# Patient Record
Sex: MALE | Race: AMERICAN INDIAN OR ALASKA NATIVE | HISPANIC OR LATINO | URBAN - METROPOLITAN AREA
[De-identification: names, ages, dates, MRNs, and addresses within clinical notes are randomized per-mention and may not be internally consistent; named-entity substitution may affect disease eponyms.]

---

## 2024-01-19 ENCOUNTER — CLAIMS CREATED FROM THE CLAIM WINDOW (OUTPATIENT)
Dept: URBAN - METROPOLITAN AREA MEDICAL CENTER 25 | Facility: MEDICAL CENTER | Age: 34
End: 2024-01-19
Payer: COMMERCIAL

## 2024-01-19 DIAGNOSIS — R13.12 DYSPHAGIA: ICD-10-CM

## 2024-01-19 DIAGNOSIS — R93.3 ABN FINDINGS-GI TRACT: ICD-10-CM

## 2024-01-19 PROCEDURE — 99253 IP/OBS CNSLTJ NEW/EST LOW 45: CPT | Performed by: STUDENT IN AN ORGANIZED HEALTH CARE EDUCATION/TRAINING PROGRAM

## 2024-01-22 ENCOUNTER — CLAIMS CREATED FROM THE CLAIM WINDOW (OUTPATIENT)
Dept: URBAN - METROPOLITAN AREA MEDICAL CENTER 25 | Facility: MEDICAL CENTER | Age: 34
End: 2024-01-22
Payer: COMMERCIAL

## 2024-01-22 DIAGNOSIS — R13.12 DYSPHAGIA: ICD-10-CM

## 2024-01-22 PROCEDURE — 43246 EGD PLACE GASTROSTOMY TUBE: CPT | Performed by: INTERNAL MEDICINE

## 2024-01-23 ENCOUNTER — CLAIMS CREATED FROM THE CLAIM WINDOW (OUTPATIENT)
Dept: URBAN - METROPOLITAN AREA MEDICAL CENTER 25 | Facility: MEDICAL CENTER | Age: 34
End: 2024-01-23
Payer: COMMERCIAL

## 2024-01-23 DIAGNOSIS — R13.12 DYSPHAGIA: ICD-10-CM

## 2024-01-23 PROCEDURE — 99232 SBSQ HOSP IP/OBS MODERATE 35: CPT | Performed by: INTERNAL MEDICINE

## 2024-01-24 ENCOUNTER — TELEPHONE ENCOUNTER (OUTPATIENT)
Dept: URBAN - METROPOLITAN AREA CLINIC 19 | Facility: CLINIC | Age: 34
End: 2024-01-24

## 2024-03-19 ENCOUNTER — LAB (OUTPATIENT)
Dept: URBAN - METROPOLITAN AREA CLINIC 128 | Facility: CLINIC | Age: 34
End: 2024-03-19

## 2024-03-19 ENCOUNTER — OV NP (OUTPATIENT)
Dept: URBAN - METROPOLITAN AREA CLINIC 128 | Facility: CLINIC | Age: 34
End: 2024-03-19
Payer: COMMERCIAL

## 2024-03-19 VITALS
BODY MASS INDEX: 20.45 KG/M2 | SYSTOLIC BLOOD PRESSURE: 126 MMHG | WEIGHT: 151 LBS | HEIGHT: 72 IN | TEMPERATURE: 97.7 F | DIASTOLIC BLOOD PRESSURE: 80 MMHG

## 2024-03-19 DIAGNOSIS — R63.4 WEIGHT LOSS, UNINTENTIONAL: ICD-10-CM

## 2024-03-19 DIAGNOSIS — R13.12 TRANSFER DYSPHAGIA: ICD-10-CM

## 2024-03-19 DIAGNOSIS — Z93.1 PRESENCE OF EXTERNALLY REMOVABLE PERCUTANEOUS ENDOSCOPIC GASTROSTOMY (PEG) TUBE: ICD-10-CM

## 2024-03-19 PROBLEM — 71457002: Status: ACTIVE | Noted: 2024-03-19

## 2024-03-19 PROBLEM — 413235002: Status: ACTIVE | Noted: 2024-03-19

## 2024-03-19 PROCEDURE — 99214 OFFICE O/P EST MOD 30 MIN: CPT | Performed by: INTERNAL MEDICINE

## 2024-03-19 NOTE — PHYSICAL EXAM CHEST:
chest wall non-tender anteriorly, breathing is unlabored without accessory muscle use, normal breath sounds anteriorly and laterally
Not Applicable

## 2024-03-19 NOTE — PHYSICAL EXAM GASTROINTESTINAL
Abdomen soft, flat, nontender, nondistended, no masses palpable , normal bowel sounds   Liver and Spleen , no hepatomegaly present, liver edge nontender , spleen not palpable  G-tube exiting left epigastrium -- clean and clear with no signs of infection  Rectal: deferred

## 2024-03-19 NOTE — HPI-TODAY'S VISIT:
Pleasant 32yo gentleman accompanied by his wife here for PEG Tube Removal.  He is a long term diabetic admitted to [facility] with staph sepsis following covid infection resulting in a 1 month hospitalization including ICU care and intubation,  with eventual discharge in late January 2024.  He had a PEG placed Jan 22, 2024 by Dr. Garcia.  He offers that PEG feedings were no longer needed after cleared to take p.o. intake again at ENT follow up about 6 weeks ago.  He offers that he lost about 40lbs during the hospital stay and has regained about 20lbs off all tube feedings (only oral intake) over the past 5-6 weeks.  BMs are regular. NO overt GI bleeding.  Energy level is good.  He offers some orthostasis.  He has mild peripheral neuropathy in the toes.  No other sequelae of diabetes.  No bood thinners.  He desires to have the feeding tube removed. As noted above it was placed 8 weeks ago.